# Patient Record
Sex: FEMALE | Race: WHITE | Employment: OTHER | URBAN - METROPOLITAN AREA
[De-identification: names, ages, dates, MRNs, and addresses within clinical notes are randomized per-mention and may not be internally consistent; named-entity substitution may affect disease eponyms.]

---

## 2017-04-24 ENCOUNTER — APPOINTMENT (OUTPATIENT)
Dept: CT IMAGING | Facility: CLINIC | Age: 82
End: 2017-04-24
Attending: EMERGENCY MEDICINE
Payer: COMMERCIAL

## 2017-04-24 ENCOUNTER — APPOINTMENT (OUTPATIENT)
Dept: GENERAL RADIOLOGY | Facility: CLINIC | Age: 82
End: 2017-04-24
Attending: EMERGENCY MEDICINE
Payer: COMMERCIAL

## 2017-04-24 ENCOUNTER — HOSPITAL ENCOUNTER (EMERGENCY)
Facility: CLINIC | Age: 82
Discharge: HOME | End: 2017-04-24
Attending: EMERGENCY MEDICINE | Admitting: EMERGENCY MEDICINE
Payer: COMMERCIAL

## 2017-04-24 VITALS
SYSTOLIC BLOOD PRESSURE: 171 MMHG | OXYGEN SATURATION: 95 % | HEART RATE: 95 BPM | TEMPERATURE: 97.4 F | RESPIRATION RATE: 16 BRPM | DIASTOLIC BLOOD PRESSURE: 80 MMHG

## 2017-04-24 DIAGNOSIS — S80.00XA CONTUSION OF KNEE, UNSPECIFIED LATERALITY, INITIAL ENCOUNTER: ICD-10-CM

## 2017-04-24 DIAGNOSIS — R10.32 ABDOMINAL PAIN, LEFT LOWER QUADRANT: ICD-10-CM
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
## 2017-04-24 DIAGNOSIS — M25.552 HIP PAIN, LEFT: ICD-10-CM

## 2017-04-24 LAB
ALBUMIN UR-MCNC: NEGATIVE MG/DL
ANION GAP SERPL CALCULATED.3IONS-SCNC: 7 MMOL/L (ref 3–14)
APPEARANCE UR: CLEAR
BACTERIA #/AREA URNS HPF: ABNORMAL /HPF
BASOPHILS # BLD AUTO: 0 10E9/L (ref 0–0.2)
BASOPHILS NFR BLD AUTO: 0.5 %
BILIRUB UR QL STRIP: NEGATIVE
BUN SERPL-MCNC: 6 MG/DL (ref 7–30)
CALCIUM SERPL-MCNC: 8.9 MG/DL (ref 8.5–10.1)
CHLORIDE SERPL-SCNC: 104 MMOL/L (ref 94–109)
CO2 SERPL-SCNC: 27 MMOL/L (ref 20–32)
COLOR UR AUTO: YELLOW
CREAT SERPL-MCNC: 0.5 MG/DL (ref 0.52–1.04)
DIFFERENTIAL METHOD BLD: ABNORMAL
EOSINOPHIL # BLD AUTO: 0.1 10E9/L (ref 0–0.7)
EOSINOPHIL NFR BLD AUTO: 1.6 %
ERYTHROCYTE [DISTWIDTH] IN BLOOD BY AUTOMATED COUNT: 12.7 % (ref 10–15)
GFR SERPL CREATININE-BSD FRML MDRD: ABNORMAL ML/MIN/1.7M2
GLUCOSE SERPL-MCNC: 277 MG/DL (ref 70–99)
GLUCOSE UR STRIP-MCNC: >499 MG/DL
HCT VFR BLD AUTO: 47.1 % (ref 35–47)
HGB BLD-MCNC: 15.4 G/DL (ref 11.7–15.7)
HGB UR QL STRIP: NEGATIVE
IMM GRANULOCYTES # BLD: 0 10E9/L (ref 0–0.4)
IMM GRANULOCYTES NFR BLD: 0.3 %
KETONES UR STRIP-MCNC: NEGATIVE MG/DL
LEUKOCYTE ESTERASE UR QL STRIP: ABNORMAL
LYMPHOCYTES # BLD AUTO: 2.1 10E9/L (ref 0.8–5.3)
LYMPHOCYTES NFR BLD AUTO: 28 %
MCH RBC QN AUTO: 29.6 PG (ref 26.5–33)
MCHC RBC AUTO-ENTMCNC: 32.7 G/DL (ref 31.5–36.5)
MCV RBC AUTO: 90 FL (ref 78–100)
MONOCYTES # BLD AUTO: 0.5 10E9/L (ref 0–1.3)
MONOCYTES NFR BLD AUTO: 7 %
MUCOUS THREADS #/AREA URNS LPF: PRESENT /LPF
NEUTROPHILS # BLD AUTO: 4.7 10E9/L (ref 1.6–8.3)
NEUTROPHILS NFR BLD AUTO: 62.6 %
NITRATE UR QL: NEGATIVE
NRBC # BLD AUTO: 0 10*3/UL
NRBC BLD AUTO-RTO: 0 /100
PH UR STRIP: 6 PH (ref 5–7)
PLATELET # BLD AUTO: 237 10E9/L (ref 150–450)
POTASSIUM SERPL-SCNC: 4 MMOL/L (ref 3.4–5.3)
RBC # BLD AUTO: 5.21 10E12/L (ref 3.8–5.2)
RBC #/AREA URNS AUTO: 2 /HPF (ref 0–2)
SODIUM SERPL-SCNC: 138 MMOL/L (ref 133–144)
SP GR UR STRIP: 1 (ref 1–1.03)
SQUAMOUS #/AREA URNS AUTO: 5 /HPF (ref 0–1)
URN SPEC COLLECT METH UR: ABNORMAL
UROBILINOGEN UR STRIP-MCNC: 0 MG/DL (ref 0–2)
WBC # BLD AUTO: 7.4 10E9/L (ref 4–11)
WBC #/AREA URNS AUTO: 8 /HPF (ref 0–2)

## 2017-04-24 PROCEDURE — 80048 BASIC METABOLIC PNL TOTAL CA: CPT | Performed by: EMERGENCY MEDICINE

## 2017-04-24 PROCEDURE — 25000128 H RX IP 250 OP 636: Performed by: EMERGENCY MEDICINE

## 2017-04-24 PROCEDURE — 25500064 ZZH RX 255 OP 636: Performed by: EMERGENCY MEDICINE

## 2017-04-24 PROCEDURE — 70450 CT HEAD/BRAIN W/O DYE: CPT

## 2017-04-24 PROCEDURE — 73502 X-RAY EXAM HIP UNI 2-3 VIEWS: CPT

## 2017-04-24 PROCEDURE — 73560 X-RAY EXAM OF KNEE 1 OR 2: CPT | Mod: LT

## 2017-04-24 PROCEDURE — 99285 EMERGENCY DEPT VISIT HI MDM: CPT | Mod: 25

## 2017-04-24 PROCEDURE — 81001 URINALYSIS AUTO W/SCOPE: CPT | Performed by: EMERGENCY MEDICINE

## 2017-04-24 PROCEDURE — 87086 URINE CULTURE/COLONY COUNT: CPT | Performed by: EMERGENCY MEDICINE

## 2017-04-24 PROCEDURE — 74177 CT ABD & PELVIS W/CONTRAST: CPT

## 2017-04-24 PROCEDURE — 85025 COMPLETE CBC W/AUTO DIFF WBC: CPT | Performed by: EMERGENCY MEDICINE

## 2017-04-24 RX ORDER — IBUPROFEN 200 MG
600 TABLET ORAL EVERY 6 HOURS PRN
Qty: 60 TABLET | Refills: 0 | Status: SHIPPED | OUTPATIENT
Start: 2017-04-24 | End: 2021-03-06

## 2017-04-24 RX ORDER — LIDOCAINE 40 MG/G
CREAM TOPICAL
Status: DISCONTINUED | OUTPATIENT
Start: 2017-04-24 | End: 2017-04-24 | Stop reason: HOSPADM

## 2017-04-24 RX ORDER — IOPAMIDOL 755 MG/ML
500 INJECTION, SOLUTION INTRAVASCULAR ONCE
Status: COMPLETED | OUTPATIENT
Start: 2017-04-24 | End: 2017-04-24

## 2017-04-24 RX ADMIN — IOPAMIDOL 74 ML: 755 INJECTION, SOLUTION INTRAVENOUS at 12:51

## 2017-04-24 RX ADMIN — SODIUM CHLORIDE 59 ML: 9 INJECTION, SOLUTION INTRAVENOUS at 12:51

## 2017-04-24 ASSESSMENT — ENCOUNTER SYMPTOMS
NAUSEA: 1
ARTHRALGIAS: 1
VOMITING: 0
ABDOMINAL PAIN: 1

## 2017-04-24 NOTE — ED PROVIDER NOTES
History     Chief Complaint:  Fall    The history is provided by the patient.      Zehra Salomon is a 85 year old female smoker with a history of diabetes who presents to the ED for evaluation after a fall. The patient reports she fell frontwards down some stairs 4/16, landing on her knees and hands. She states she hit her head on a wall when she fell, but she did not lose consciousness. She did not seek medical care after that fall and mentions she has been in persistent pain since. She complains of left hip pain, left knee pain and left lower quadrant abdominal pain and nausea. She denies any vomiting. She has been able to ambulate since the fall.    Allergies:  Metformin  Penicillins    Medications:    One touch ultra test  Amaryl  Aspirin    Past Medical History:    Abnormal stress test  BCC  GERD  Paget's disease  Chest pain  Menopausal syndrome  Hyperlipidemia  Chest Pain    Past Surgical History:    Angiogram  Drain abscess left hip  Cholecystectomy  Hernia repair    Family History:    Diabetes- mother, brother  Throat cancer- brother  Cardiovascular- father    Social History:  The patient was accompanied to the ED by her grandson.  Smoking Status: Current every day smoker 1.00 packs daily for 30 yrs  Alcohol Use: No     Review of Systems   Gastrointestinal: Positive for abdominal pain (LLQ) and nausea. Negative for vomiting.   Musculoskeletal: Positive for arthralgias.        Left knee and left hip pain.   All other systems reviewed and are negative.    Physical Exam   First Vitals:  BP: (!) 178/102  Pulse: 95  Temp: 97.4  F (36.3  C)  Resp: 16  SpO2: 95 %      Physical Exam  Constitutional: Patient appears well-developed and well-nourished. Patient is not in distress  HENT:   Head: No external signs of trauma noted.  Eyes: Conjunctivae are normal. Pupils are equal, round, and reactive to light.   Cardiovascular:   Normal rate, regular rhythm and normal heart sounds.   Exam reveals no friction rub.   No  murmur heard.  Pulmonary/Chest:   Effort normal and breath sounds normal.   No respiratory distress.   There are no wheezes.   There are no rales.   Abdominal:   Soft.   Bowel sounds normal.   There is mild distension.   There is LLQ tenderness.   There is no rebound or guarding.   Musculoskeletal:   Normal range of motion.   Normal Tone  No CVA tenderness  No midline C, T, or L-spine tenderness.  There is left lumbar paraspinal tenderness  Neurological: Patient is alert and oriented to person, place, and time. Normal strength and sensation.  Skin: Skin is warm and dry. Patient is not diaphoretic.    Emergency Department Course     Imaging:  Radiographic findings were communicated with the patient who voiced understanding of the findings.    Knee XR, 1/2 views, left:  IMPRESSION: Degenerative change involving the medial compartment. No  fracture or other evidence of injury is seen.   Per Radiology.    Head CT w/o Contrast:   IMPRESSION: Diffuse cerebral volume loss and cerebral white matter  changes consistent with chronic small vessel ischemic disease. No  evidence for acute intracranial pathology.   Preliminary result, per Radiology.    CT Abdomen Pelvis w IV Contrast:  IMPRESSION:   1. No evidence of traumatic organ injury in the abdomen or pelvis.  2. Fatty infiltration of the liver.  3. Sigmoid diverticulosis. No evidence of acute diverticulitis.  Preliminary result, per Radiology.    XR Pelvis w Hip Left:   IMPRESSION: No fracture or osseous lesion is seen. There are mild  degenerative changes in both hip joints including marginal osteophyte  formation. No other abnormality is seen.   Per Radiology.    Laboratory:  CBC: WBC 7.4, HGB 15.4,   BMP: Glucose 277(H), BUN 6(L), Creat 0.50(L), o/w WNL    UA:Glucose >499(A), Leukocyte Esterase Trace, Bacteria Moderate, Squamous Epithelial 5(H), Mucous Present, WBC/HPF 8(H) o/w negative  Urine Culture Pending    Interventions:  1251 NS Bolus IV    Emergency  Department Course:  Nursing notes and vitals reviewed.  I performed an exam of the patient as documented above.     Findings and plan explained to the patient. Patient discharged home with instructions regarding supportive care, medications, and reasons to return. The importance of close follow-up was reviewed.     Impression & Plan    Medical Decision Making:  Zehra Salomon is an 85 year old female who presents to the ER for evaluation of abdominal pain, as well as left hip and knee pain after a fall. The patient just simply has not been feeling better after the fall. She states she also did strike her head. All of her radiological imaging is normal. CT did find evidence of diverticulosis, but not diverticulitis. The patient's blood work looks normal. At this time, I believe she can be discharged, but she should follow up in the outpatient setting with her primary care physician.  Anticipatory guidance given prior to discharge.    Diagnosis:  1. (W19.XXXA) Fall, initial encounter    2. (S80.00XA) Contusion of knee, unspecified laterality, initial encounter    Disposition:  The patient was discharged home.    4/24/2017   [facility] EMERGENCY DEPARTMENT    ELLEN, Sejal Hathaway, am serving as a scribe at 11:10 on April 24, 2017 to document services personally performed by  Dr. Smith, based on my observations and the provider's statements to me.       Jasen Smith,   04/24/17 [id number]

## 2017-04-24 NOTE — ED NOTES
Patient presents to the ED with left hip and LLQ abdominal pain. Reports has had pain since fell on the 16th. States slipped while going down the steps and landed on knees and hands. Not seen after fall. Has had persistent pain since. Is ambulatory.

## 2017-04-24 NOTE — ED AVS SNAPSHOT
[facility] Emergency Department    [address]    [facility] [address]    Phone:  [phone]    Fax:  [phone]                                       Zehra Salomon   MRN: [id number]    Department:  [facility] Emergency Department   Date of Visit:  4/24/2017           After Visit Summary Signature Page     I have received my discharge instructions, and my questions have been answered. I have discussed any challenges I see with this plan with the nurse or doctor.    ..........................................................................................................................................  Patient/Patient Representative Signature      ..........................................................................................................................................  Patient Representative Print Name and Relationship to Patient    ..................................................               ................................................  Date                                            Time    ..........................................................................................................................................  Reviewed by Signature/Title    ...................................................              ..............................................  Date                                                            Time

## 2017-04-24 NOTE — ED AVS SNAPSHOT
[facility] Emergency Department    [address]    [facility] [address]    Phone:  [phone]    Fax:  [phone]                                       Zehra Salomon   MRN: [id number]    Department:  [facility] Emergency Department   Date of Visit:  4/24/2017           Patient Information     Date Of Birth          3/27/1932        Your diagnoses for this visit were:     Fall, initial encounter     Contusion of knee, unspecified laterality, initial encounter     Abdominal pain, left lower quadrant     Hip pain, left        You were seen by Jasen Smith DO.      Follow-up Information     Follow up with Mary Solis MD. Call in 2 days.    Specialty:  Internal Medicine    Why:  As needed    Contact information:    [facility]  [address]  [facility] [address]  [phone]          Follow up with [facility] Emergency Department.    Specialty:  EMERGENCY MEDICINE    Why:  If symptoms worsen    Contact information:    [address] [facility] [address] [phone]        Discharge Instructions         *Abdominal Pain, Unknown Cause (Female)    The exact cause of your abdominal (stomach) pain is not certain. This does not mean that this is something to worry about, or the right tests were not done. Everyone likes to know the exact cause of the problem, but sometimes with abdominal pain, there is no clear-cut cause, and this could be a good thing. The good news is that your symptoms can be treated, and you will feel better.   Your condition does not seem serious now; however, sometimes the signs of a serious problem may take more time to appear. For this reason, it is important for you to watch for any new symptoms, problems, or worsening of your condition.  Over the next few days, the abdominal pain may come and go, or be continuous. Other common symptoms can include nausea and vomiting. Sometimes it can be  difficult to tell if you feel nauseous, you may just feel bad and not associate that feeling with nausea. Constipation, diarrhea, and a fever may go along with the pain.  The pain may continue even if treated correctly over the following days. Depending on how things go, sometimes the cause can become clear and may require further or different treatment. Additional evaluations, medications, or tests may be needed.  Home care  Your health care provider may prescribe medications for pain, symptoms, or an infection.  Follow the health care provider's instructions for taking these medications.  General care    Rest until your next exam. No strenuous activities.    Try to find positions that ease discomfort. A small pillow placed on the abdomen may help relieve pain.    Something warm on your abdomen (such as a heating pad) may help, but be careful not to burn yourself.  Diet    Do not force yourself to eat, especially if having cramps, vomiting, or diarrhea.    Water is important so you do not get dehydrated. Soup may also be good. Sports drinks may also help, especially if they are not too acidic. Make sure you don't drink sugary drinks as this can make things worse. Take liquids in small amounts. Do not guzzle them.    Caffeine sometimes makes the pain and cramping worse.    Avoid dairy products if you have vomiting or diarrhea.    Don't eat large amounts at a time. Wait a few minutes between bites.    Eat a diet low in fiber (called a low-residue diet). Foods allowed include refined breads, white rice, fruit and vegetable juices without pulp, tender meats. These foods will pass more easily through the intestine.    Avoid fried or fatty foods, dairy, alcohol and spicy foods until your symptoms go away.  Follow-up care  Follow up with your health care provider as instructed, or if your pain does not begin to improve in the next 24 hours.  When to seek medical care  Seek prompt medical care if any of the following  occur:    Pain gets worse or moves to the right lower abdomen    New or worsening vomiting or diarrhea    Swelling of the abdomen    Unable to pass stool for more than three days    New fever over 101  F (38.3 C), or rising fever    Blood in vomit or bowel movements (dark red or black color)    Jaundice (yellow color of eyes and skin)    Weakness, dizziness    Chest, arm, back, neck or jaw pain    Unexpected vaginal bleeding or missed period  Call 911  Call emergency services if any of the following occur:    Trouble breathing    Confusion    Fainting or loss of consciousness    Rapid heart rate    Seizure    7638-2654 [facility], [address] [place] [address]. All rights reserved. This information is not intended as a substitute for professional medical care. Always follow your healthcare professional's instructions.          Bruises (Contusions)    A contusion is a bruise. A bruise happens when a blow to your body doesn't break the skin but does break blood vessels beneath the skin. Blood leaking from the broken vessels causes redness and swelling. As it heals, your bruise is likely to turn colors like purple, green, and yellow. This is normal. The bruise should fade in 2 or 3 weeks.  Factors that make you more likely to bruise  Almost everyone bruises now and then. Certain people do bruise more easily than others. You're more prone to bruising as you get older. That's because blood vessels become more fragile with age. You're also more likely to bruise if you have a clotting disorder such as hemophilia or take medications that reduce clotting, including aspirin.  When to go to the emergency room (ER)  Bruises almost always heal on their own without special treatment. But for some people, a bad bruise can be serious. Seek medical care if you:    Have a clotting disorder such as hemophilia.    Have cirrhosis or other serious liver disease.    Take blood-thinning medications such as warfarin  (Coumadin).  What to expect in the ER  A doctor will examine your bruise and ask about any health conditions you have. In some cases, you may have a test to check how well your blood clots. Other treatment will depend on your needs.  Follow-up care  Sometimes a bruise gets worse instead of better. It may become larger and more swollen. This can occur when your body walls off a small pool of blood under the skin (hematoma). In very rare cases, your doctor may need to drain excess blood from the area.  Tip:  Apply an ice pack or bag of frozen peas to a bruise (keep a thin cloth between the cold source and your skin). This can help reduce redness and swelling.     7469-0603 [employer]. [address] [place] [address], [place] [address]. All rights reserved. This information is not intended as a substitute for professional medical care. Always follow your healthcare professional's instructions.        Fall Prevention  Falls often occur due to slipping, tripping or losing your balance. Millions of people fall every year and injure themselves. Here are ways to reduce your risk of falling again.    Think about your fall, was there anything that caused your fall that can be fixed, removed, or replaced?    Make your home safe by keeping walkways clear of objects you may trip over.    Use non-slip pads under rugs. Do not use area rugs or small throw rugs.    Use non-slip mats in bathtubs and showers.    Install handrails and lights on staircases.    Do not walk in poorly lit areas.    Do not stand on chairs or wobbly ladders.    Use caution when reaching overhead or looking upward. This position can cause a loss of balance.    Be sure your shoes fit properly, have non-slip bottoms and are in good condition.     Wear shoes both inside and out. Avoid going barefoot or wearing slippers.    Be cautious when going up and down stairs, curbs, and when walking on uneven sidewalks.    If your balance is poor, consider using a  cane or walker.    If your fall was related to alcohol use, stop or limit alcohol intake.     If your fall was related to use of sleeping medicines, talk to your doctor about this. You may need to reduce your dosage at bedtime if you awaken during the night to go to the bathroom.      To reduce the need for nighttime bathroom trips:    Avoid drinking fluids for several hours before going to bed    Empty your bladder before going to bed    Men can keep a urinal at the bedside    Stay as active as you can. Balance, flexibility, strength, and endurance all come from exercise. They all play a role in preventing falls. Ask your healthcare provider which types of activity are right for you.    Get your vision checked on a regular basis.    If you have pets, know where they are before you stand up or walk so you don't trip over them.    Use night lights.    5050-1220 [employer]. [address] [place] [address]. All rights reserved. This information is not intended as a substitute for professional medical care. Always follow your healthcare professional's instructions.          24 Hour Appointment Hotline       To make an appointment at any [facility], call [phone] ([phone]). If you don't have a family doctor or clinic, we will help you find one. [facility] clinics are conveniently located to serve the needs of you and your family.             Review of your medicines      START taking        Dose / Directions Last dose taken    ibuprofen 200 MG tablet   Commonly known as:  ADVIL/MOTRIN   Dose:  600 mg   Quantity:  60 tablet        Take 3 tablets (600 mg) by mouth every 6 hours as needed for mild pain or fever   Refills:  0          Our records show that you are taking the medicines listed below. If these are incorrect, please call your family doctor or clinic.        Dose / Directions Last dose taken    aspirin 81 MG tablet   Quantity:  100        ONE DAILY   Refills:  3         blood glucose monitoring meter device kit   Dose:  1 Device   Quantity:  1 kit        1 Device by Device route daily. Use to test blood sugars 2x daily as directed.   Refills:  0        blood glucose monitoring test strip   Commonly known as:  ONE TOUCH ULTRA   Dose:  1 Box   Quantity:  100 strip        1 Box by Strip route 2 times daily.   Refills:  prn        glimepiride 2 MG tablet   Commonly known as:  AMARYL   Dose:  2 mg   Quantity:  90 tablet        Take 1 tablet by mouth every morning (before breakfast).   Refills:  3        ONE TOUCH DELICA LANCETS Misc   Quantity:  100 each        Use to test blood sugars 2 daily or as directed.   Refills:  5                Prescriptions were sent or printed at these locations (1 Prescription)                   Other Prescriptions                Printed at Department/Unit printer (1 of 1)         ibuprofen (ADVIL/MOTRIN) 200 MG tablet                Procedures and tests performed during your visit     Basic metabolic panel    CBC with platelets differential    CT Abdomen Pelvis w IV contrast only (Trauma/AAA)    Head CT w/o contrast    Peripheral IV catheter    UA with Microscopic    Urine Culture    XR Knee Left 1/2 Views    XR Pelvis w Hip Left 1 View      Orders Needing Specimen Collection     None      Pending Results     Date and Time Order Name Status Description    4/24/2017 1121 Urine Culture In process     4/24/2017 1120 CT Abdomen Pelvis w IV contrast only (Trauma/AAA) Preliminary             Pending Culture Results     Date and Time Order Name Status Description    4/24/2017 1121 Urine Culture In process             Test Results From Your Hospital Stay        4/24/2017 12:02 PM      Component Results     Component Value Ref Range & Units Status    WBC 7.4 4.0 - 11.0 10e9/L Final    RBC Count 5.21 (H) 3.8 - 5.2 10e12/L Final    Hemoglobin 15.4 11.7 - 15.7 g/dL Final    Hematocrit 47.1 (H) 35.0 - 47.0 % Final    MCV 90 78 - 100 fl Final    MCH 29.6 26.5 - 33.0 pg  Final    MCHC 32.7 31.5 - 36.5 g/dL Final    RDW 12.7 10.0 - 15.0 % Final    Platelet Count 237 150 - 450 10e9/L Final    Diff Method Automated Method  Final    % Neutrophils 62.6 % Final    % Lymphocytes 28.0 % Final    % Monocytes 7.0 % Final    % Eosinophils 1.6 % Final    % Basophils 0.5 % Final    % Immature Granulocytes 0.3 % Final    Nucleated RBCs 0 0 /100 Final    Absolute Neutrophil 4.7 1.6 - 8.3 10e9/L Final    Absolute Lymphocytes 2.1 0.8 - 5.3 10e9/L Final    Absolute Monocytes 0.5 0.0 - 1.3 10e9/L Final    Absolute Eosinophils 0.1 0.0 - 0.7 10e9/L Final    Absolute Basophils 0.0 0.0 - 0.2 10e9/L Final    Abs Immature Granulocytes 0.0 0 - 0.4 10e9/L Final    Absolute Nucleated RBC 0.0  Final         4/24/2017 12:24 PM      Component Results     Component Value Ref Range & Units Status    Sodium 138 133 - 144 mmol/L Final    Potassium 4.0 3.4 - 5.3 mmol/L Final    Chloride 104 94 - 109 mmol/L Final    Carbon Dioxide 27 20 - 32 mmol/L Final    Anion Gap 7 3 - 14 mmol/L Final    Glucose 277 (H) 70 - 99 mg/dL Final    Urea Nitrogen 6 (L) 7 - 30 mg/dL Final    Creatinine 0.50 (L) 0.52 - 1.04 mg/dL Final    GFR Estimate >90  Non  GFR Calc   >60 mL/min/1.7m2 Final    GFR Estimate If Black >90   GFR Calc   >60 mL/min/1.7m2 Final    Calcium 8.9 8.5 - 10.1 mg/dL Final         4/24/2017  1:29 PM      Narrative     CT ABDOMEN AND PELVIS WITH CONTRAST April 24, 2017 12:58 PM     HISTORY: Fall on April 16th, complains of left lower quadrant pain.    TECHNIQUE: Volumetric helical acquisition of CT images from the lung  bases through the symphysis pubis after the administration of 74 mL of  Isovue 370 intravenous  contrast. Radiation dose for this scan was  reduced using automated exposure control, adjustment of the mA and/or  kV according to patient size, or iterative reconstruction technique.    COMPARISON: 10/31/2009.    FINDINGS: The lung bases are clear. There is diffuse  fatty  infiltration of the liver. No evidence of hepatic laceration.  Postoperative changes of cholecystectomy. The spleen, pancreas,  adrenal glands, and kidneys show no acute abnormality. There are  bilateral renal cysts. Large and small bowel are of normal caliber.  There is sigmoid diverticulosis. No evidence of acute diverticulitis.  Moderate amount of stool throughout the colon. No ascites or fluid  collections. No evidence of free intraperitoneal air. Moderate  atherosclerotic changes in the aorta and iliac arteries. The distal  thoracic and upper abdominal aorta are ectatic. No acute fractures are  identified. There are degenerative changes in the lumbar spine.         Impression     IMPRESSION:   1. No evidence of traumatic organ injury in the abdomen or pelvis.  2. Fatty infiltration of the liver.  3. Sigmoid diverticulosis. No evidence of acute diverticulitis.               4/24/2017  1:52 PM      Narrative     CT OF THE HEAD WITHOUT CONTRAST April 24, 2017 12:58 PM     HISTORY: Fall, head injury.    TECHNIQUE: 5 mm thick axial CT images of the head were acquired  without IV contrast material. Radiation dose for this scan was reduced  using automated exposure control, adjustment of the mA and/or kV  according to patient size, or iterative reconstruction technique.    COMPARISON: Head CT 10/31/2009.    FINDINGS: There is mild diffuse cerebral volume loss. There are subtle  patchy areas of decreased density in the cerebral white matter  bilaterally that are consistent with sequela of chronic small vessel  ischemic disease.    The ventricles and basal cisterns are within normal limits in  configuration given the degree of cerebral volume loss. There is no  midline shift. There are no extra-axial fluid collections.     No intracranial hemorrhage, mass or recent infarct.    The visualized paranasal sinuses are well-aerated. There is no  mastoiditis. There are no fractures of the visualized bones.          Impression     IMPRESSION: Diffuse cerebral volume loss and cerebral white matter  changes consistent with chronic small vessel ischemic disease. No  evidence for acute intracranial pathology.             MICHAEL SRIVASTAVA MD         4/24/2017  1:11 PM      Component Results     Component Value Ref Range & Units Status    Color Urine Yellow  Final    Appearance Urine Clear  Final    Glucose Urine >499 (A) NEG mg/dL Final    Bilirubin Urine Negative NEG Final    Ketones Urine Negative NEG mg/dL Final    Specific Gravity Urine 1.005 1.003 - 1.035 Final    Blood Urine Negative NEG Final    pH Urine 6.0 5.0 - 7.0 pH Final    Protein Albumin Urine Negative NEG mg/dL Final    Urobilinogen mg/dL 0.0 0.0 - 2.0 mg/dL Final    Nitrite Urine Negative NEG Final    Leukocyte Esterase Urine Trace (A) NEG Final    Source Midstream Urine  Final    WBC Urine 8 (H) 0 - 2 /HPF Final    RBC Urine 2 0 - 2 /HPF Final    Bacteria Urine Moderate (A) NEG /HPF Final    Squamous Epithelial /HPF Urine 5 (H) 0 - 1 /HPF Final    Mucous Urine Present (A) NEG /LPF Final         4/24/2017 12:53 PM         4/24/2017  1:32 PM      Narrative     PELVIS AND LEFT HIP ONE VIEW April 24, 2017 1:23 PM    HISTORY: Pain after falling.    COMPARISON: None.        Impression     IMPRESSION: No fracture or osseous lesion is seen. There are mild  degenerative changes in both hip joints including marginal osteophyte  formation. No other abnormality is seen.     RITA KELLY MD         4/24/2017  1:32 PM      Narrative     LEFT KNEE TWO VIEWS April 24, 2017 1:22 PM    HISTORY: Pain after falling.    COMPARISON: None.    FINDINGS: No fracture or osseous lesion is seen. There is moderate  joint space loss in the medial compartment. The remaining joint spaces  are well preserved. No soft tissue pathology is seen.        Impression     IMPRESSION: Degenerative change involving the medial compartment. No  fracture or other evidence of injury is seen.     RITA  MD ROBIN                Clinical Quality Measure: Blood Pressure Screening     Your blood pressure was checked while you were in the emergency department today. The last reading we obtained was  BP: 171/80 . Please read the guidelines below about what these numbers mean and what you should do about them.  If your systolic blood pressure (the top number) is less than 120 and your diastolic blood pressure (the bottom number) is less than 80, then your blood pressure is normal. There is nothing more that you need to do about it.  If your systolic blood pressure (the top number) is 120-139 or your diastolic blood pressure (the bottom number) is 80-89, your blood pressure may be higher than it should be. You should have your blood pressure rechecked within a year by a primary care provider.  If your systolic blood pressure (the top number) is 140 or greater or your diastolic blood pressure (the bottom number) is 90 or greater, you may have high blood pressure. High blood pressure is treatable, but if left untreated over time it can put you at risk for heart attack, stroke, or kidney failure. You should have your blood pressure rechecked by a primary care provider within the next 4 weeks.  If your provider in the emergency department today gave you specific instructions to follow-up with your doctor or provider even sooner than that, you should follow that instruction and not wait for up to 4 weeks for your follow-up visit.        Thank you for choosing [facility]       Thank you for choosing [facility] for your care. Our goal is always to provide you with excellent care. Hearing back from our patients is one way we can continue to improve our services. Please take a few minutes to complete the written survey that you may receive in the mail after you visit with us. Thank you!        [employer] Information     [employer] lets you send messages to your doctor, view your test results, renew your prescriptions, schedule  "appointments and more. To sign up, go to [email or web address] . Click on \"Log in\" on the left side of the screen, which will take you to the Welcome page. Then click on \"Sign up Now\" on the right side of the page.     You will be asked to enter the access code listed below, as well as some personal information. Please follow the directions to create your username and password.     Your access code is: [email or web address]  Expires: 2017  2:25 PM     Your access code will  in 90 days. If you need help or a new code, please call your [facility] clinic or [phone].        Care EveryWhere ID     This is your Care EveryWhere ID. This could be used by other organizations to access your [facility] medical records  [id number]        After Visit Summary       This is your record. Keep this with you and show to your community pharmacist(s) and doctor(s) at your next visit.                  "

## 2017-04-24 NOTE — DISCHARGE INSTRUCTIONS
*Abdominal Pain, Unknown Cause (Female)    The exact cause of your abdominal (stomach) pain is not certain. This does not mean that this is something to worry about, or the right tests were not done. Everyone likes to know the exact cause of the problem, but sometimes with abdominal pain, there is no clear-cut cause, and this could be a good thing. The good news is that your symptoms can be treated, and you will feel better.   Your condition does not seem serious now; however, sometimes the signs of a serious problem may take more time to appear. For this reason, it is important for you to watch for any new symptoms, problems, or worsening of your condition.  Over the next few days, the abdominal pain may come and go, or be continuous. Other common symptoms can include nausea and vomiting. Sometimes it can be difficult to tell if you feel nauseous, you may just feel bad and not associate that feeling with nausea. Constipation, diarrhea, and a fever may go along with the pain.  The pain may continue even if treated correctly over the following days. Depending on how things go, sometimes the cause can become clear and may require further or different treatment. Additional evaluations, medications, or tests may be needed.  Home care  Your health care provider may prescribe medications for pain, symptoms, or an infection.  Follow the health care provider's instructions for taking these medications.  General care    Rest until your next exam. No strenuous activities.    Try to find positions that ease discomfort. A small pillow placed on the abdomen may help relieve pain.    Something warm on your abdomen (such as a heating pad) may help, but be careful not to burn yourself.  Diet    Do not force yourself to eat, especially if having cramps, vomiting, or diarrhea.    Water is important so you do not get dehydrated. Soup may also be good. Sports drinks may also help, especially if they are not too acidic. Make sure you  don't drink sugary drinks as this can make things worse. Take liquids in small amounts. Do not guzzle them.    Caffeine sometimes makes the pain and cramping worse.    Avoid dairy products if you have vomiting or diarrhea.    Don't eat large amounts at a time. Wait a few minutes between bites.    Eat a diet low in fiber (called a low-residue diet). Foods allowed include refined breads, white rice, fruit and vegetable juices without pulp, tender meats. These foods will pass more easily through the intestine.    Avoid fried or fatty foods, dairy, alcohol and spicy foods until your symptoms go away.  Follow-up care  Follow up with your health care provider as instructed, or if your pain does not begin to improve in the next 24 hours.  When to seek medical care  Seek prompt medical care if any of the following occur:    Pain gets worse or moves to the right lower abdomen    New or worsening vomiting or diarrhea    Swelling of the abdomen    Unable to pass stool for more than three days    New fever over 101  F (38.3 C), or rising fever    Blood in vomit or bowel movements (dark red or black color)    Jaundice (yellow color of eyes and skin)    Weakness, dizziness    Chest, arm, back, neck or jaw pain    Unexpected vaginal bleeding or missed period  Call 911  Call emergency services if any of the following occur:    Trouble breathing    Confusion    Fainting or loss of consciousness    Rapid heart rate    Seizure    4385-1833 [facility], [address] [place] [address], [place] [address]. All rights reserved. This information is not intended as a substitute for professional medical care. Always follow your healthcare professional's instructions.          Bruises (Contusions)    A contusion is a bruise. A bruise happens when a blow to your body doesn't break the skin but does break blood vessels beneath the skin. Blood leaking from the broken vessels causes redness and swelling. As it heals, your bruise is likely to turn  colors like purple, green, and yellow. This is normal. The bruise should fade in 2 or 3 weeks.  Factors that make you more likely to bruise  Almost everyone bruises now and then. Certain people do bruise more easily than others. You're more prone to bruising as you get older. That's because blood vessels become more fragile with age. You're also more likely to bruise if you have a clotting disorder such as hemophilia or take medications that reduce clotting, including aspirin.  When to go to the emergency room (ER)  Bruises almost always heal on their own without special treatment. But for some people, a bad bruise can be serious. Seek medical care if you:    Have a clotting disorder such as hemophilia.    Have cirrhosis or other serious liver disease.    Take blood-thinning medications such as warfarin (Coumadin).  What to expect in the ER  A doctor will examine your bruise and ask about any health conditions you have. In some cases, you may have a test to check how well your blood clots. Other treatment will depend on your needs.  Follow-up care  Sometimes a bruise gets worse instead of better. It may become larger and more swollen. This can occur when your body walls off a small pool of blood under the skin (hematoma). In very rare cases, your doctor may need to drain excess blood from the area.  Tip:  Apply an ice pack or bag of frozen peas to a bruise (keep a thin cloth between the cold source and your skin). This can help reduce redness and swelling.     7190-4206 [employer]. [address] [place] [address], [place] [address]. All rights reserved. This information is not intended as a substitute for professional medical care. Always follow your healthcare professional's instructions.        Fall Prevention  Falls often occur due to slipping, tripping or losing your balance. Millions of people fall every year and injure themselves. Here are ways to reduce your risk of falling again.    Think about your  fall, was there anything that caused your fall that can be fixed, removed, or replaced?    Make your home safe by keeping walkways clear of objects you may trip over.    Use non-slip pads under rugs. Do not use area rugs or small throw rugs.    Use non-slip mats in bathtubs and showers.    Install handrails and lights on staircases.    Do not walk in poorly lit areas.    Do not stand on chairs or wobbly ladders.    Use caution when reaching overhead or looking upward. This position can cause a loss of balance.    Be sure your shoes fit properly, have non-slip bottoms and are in good condition.     Wear shoes both inside and out. Avoid going barefoot or wearing slippers.    Be cautious when going up and down stairs, curbs, and when walking on uneven sidewalks.    If your balance is poor, consider using a cane or walker.    If your fall was related to alcohol use, stop or limit alcohol intake.     If your fall was related to use of sleeping medicines, talk to your doctor about this. You may need to reduce your dosage at bedtime if you awaken during the night to go to the bathroom.      To reduce the need for nighttime bathroom trips:    Avoid drinking fluids for several hours before going to bed    Empty your bladder before going to bed    Men can keep a urinal at the bedside    Stay as active as you can. Balance, flexibility, strength, and endurance all come from exercise. They all play a role in preventing falls. Ask your healthcare provider which types of activity are right for you.    Get your vision checked on a regular basis.    If you have pets, know where they are before you stand up or walk so you don't trip over them.    Use night lights.    5884-4374 [employer]. [address] [place] [address], [place] [address]. All rights reserved. This information is not intended as a substitute for professional medical care. Always follow your healthcare professional's instructions.

## 2017-04-25 LAB
BACTERIA SPEC CULT: NORMAL
Lab: NORMAL
MICRO REPORT STATUS: NORMAL
SPECIMEN SOURCE: NORMAL

## 2018-05-28 ENCOUNTER — HOSPITAL ENCOUNTER (EMERGENCY)
Facility: CLINIC | Age: 83
Discharge: HOME | End: 2018-05-28
Attending: EMERGENCY MEDICINE
Payer: COMMERCIAL

## 2018-05-28 VITALS
SYSTOLIC BLOOD PRESSURE: 174 MMHG | WEIGHT: 135 LBS | BODY MASS INDEX: 23.05 KG/M2 | RESPIRATION RATE: 20 BRPM | DIASTOLIC BLOOD PRESSURE: 93 MMHG | HEIGHT: 64 IN | OXYGEN SATURATION: 94 % | HEART RATE: 93 BPM | TEMPERATURE: 97.9 F

## 2018-05-28 DIAGNOSIS — R06.02 SOB (SHORTNESS OF BREATH): ICD-10-CM

## 2018-05-28 NOTE — ED PROVIDER NOTES
Before I could evaluate the patient, nurse reports that patient left ED.     Yazmin Oleary MD  05/28/18 8524

## 2018-12-03 DIAGNOSIS — R33.9 URINARY RETENTION: Primary | ICD-10-CM

## 2020-08-19 ENCOUNTER — APPOINTMENT (OUTPATIENT)
Dept: ULTRASOUND IMAGING | Facility: CLINIC | Age: 85
End: 2020-08-19
Attending: EMERGENCY MEDICINE
Payer: COMMERCIAL

## 2020-08-19 ENCOUNTER — HOSPITAL ENCOUNTER (EMERGENCY)
Facility: CLINIC | Age: 85
Discharge: HOME | End: 2020-08-19
Attending: EMERGENCY MEDICINE | Admitting: EMERGENCY MEDICINE
Payer: COMMERCIAL

## 2020-08-19 VITALS
OXYGEN SATURATION: 95 % | SYSTOLIC BLOOD PRESSURE: 176 MMHG | HEART RATE: 80 BPM | TEMPERATURE: 97.4 F | HEIGHT: 63 IN | RESPIRATION RATE: 16 BRPM | BODY MASS INDEX: 23.04 KG/M2 | DIASTOLIC BLOOD PRESSURE: 66 MMHG | WEIGHT: 130 LBS

## 2020-08-19 DIAGNOSIS — L03.116 CELLULITIS OF LEFT LOWER EXTREMITY: ICD-10-CM

## 2020-08-19 PROCEDURE — 99284 EMERGENCY DEPT VISIT MOD MDM: CPT | Mod: 25

## 2020-08-19 PROCEDURE — 93971 EXTREMITY STUDY: CPT | Mod: LT

## 2020-08-19 PROCEDURE — 25000132 ZZH RX MED GY IP 250 OP 250 PS 637: Performed by: EMERGENCY MEDICINE

## 2020-08-19 RX ORDER — ATORVASTATIN CALCIUM 20 MG/1
20 TABLET, FILM COATED ORAL DAILY
COMMUNITY
Start: 2020-05-21

## 2020-08-19 RX ORDER — CLOPIDOGREL BISULFATE 75 MG/1
75 TABLET ORAL DAILY
COMMUNITY
Start: 2020-06-10

## 2020-08-19 RX ORDER — AMLODIPINE BESYLATE 5 MG/1
5 TABLET ORAL DAILY
COMMUNITY
Start: 2020-05-26

## 2020-08-19 RX ORDER — CEPHALEXIN 500 MG/1
500 CAPSULE ORAL ONCE
Status: COMPLETED | OUTPATIENT
Start: 2020-08-19 | End: 2020-08-19

## 2020-08-19 RX ORDER — CEPHALEXIN 500 MG/1
500 CAPSULE ORAL 4 TIMES DAILY
Qty: 27 CAPSULE | Refills: 0 | Status: SHIPPED | OUTPATIENT
Start: 2020-08-20 | End: 2020-08-27

## 2020-08-19 RX ADMIN — CEPHALEXIN 500 MG: 500 CAPSULE ORAL at 21:32

## 2020-08-19 ASSESSMENT — ENCOUNTER SYMPTOMS
ARTHRALGIAS: 1
COLOR CHANGE: 1

## 2020-08-19 ASSESSMENT — MIFFLIN-ST. JEOR: SCORE: 988.81

## 2020-08-19 NOTE — ED AVS SNAPSHOT
Emergency Department  [address] [place] [id number]  Phone:  [phone]  Fax:  [phone]                                    Zehra Salomon   MRN: [id number]    Department:   Emergency Department   Date of Visit:  8/19/2020           After Visit Summary Signature Page    I have received my discharge instructions, and my questions have been answered. I have discussed any challenges I see with this plan with the nurse or doctor.    ..........................................................................................................................................  Patient/Patient Representative Signature      ..........................................................................................................................................  Patient Representative Print Name and Relationship to Patient    ..................................................               ................................................  Date                                   Time    ..........................................................................................................................................  Reviewed by Signature/Title    ...................................................              ..............................................  Date                                               Time          22EPIC Rev 08/18

## 2020-08-20 NOTE — DISCHARGE INSTRUCTIONS
You should  the rest of your antibiotic course tomorrow morning and start taking them as prescribed.  You should return the emergency room if you get increasing pain, redness, swelling or develop fever in that leg.

## 2020-08-20 NOTE — ED PROVIDER NOTES
"History     Chief Complaint:  Ankle Pain     HPI  Zehra Salomon is a 88 year old female with a history of TIA, GERD, Hyperlipidemia who presents for evaluation of ankle pain.The patient states th she fell at home ten days ago and she did not present to a clinic as she has an at home nurse that came in between this time. She states that she has been ambulatory. Her nurse does add concern for cellulitis as there is erythema and warmth to the left calf and ankle. She denies any chest pain or chest pressure.    Allergies:  Metformin   Penicillin     Medications:   Norvasc   Plavix   Lipitor  Asprin 81 mg   Amaryl     Medical History:   basal cell carcinoma of skin   Macular degeneration   Hyperlipidemia    Paget's disease  diabetes mellitus type 2   GERD     Surgical History   Angiogram   Drain mouth abscess   Herniorrhaphy  Cholecystectomy     Family History:   Mother: diabetes  Father:  Cardiovascular    Social History:  Patient was not accompanied to the ED.   Smoking Status:  Smoker    Type: Cigarettes   Packs/Day: 0.25  Smokeless Tobacco: Never Used   Alcohol Use: Negative   Drug Use: Negative    Mary Solis       Review of Systems   Cardiovascular: Negative for chest pain (neg pressure).   Musculoskeletal: Positive for arthralgias (ankle).   Skin: Positive for color change.   All other systems reviewed and negative.       Physical Exam     Patient Vitals for the past 24 hrs:   BP Temp Temp src Pulse Resp SpO2 Height Weight   08/19/20 1902 (!) 196/62 97.4  F (36.3  C) Temporal 83 16 95 % 1.6 m (5' 3\") 59 kg (130 lb)          Physical Exam  Constitutional: Alert, attentive, GCS 15  HENT:    Nose: Nose normal.    Mouth/Throat: Oropharynx is clear, mucous membranes are moist  Eyes: EOM are normal, anicteric, conjugate gaze  CV: regular rate and rhythm; no murmurs  Chest: Effort normal and breath sounds clear without wheezing or rales, symmetric bilaterally   GI:  non tender. No distension. No guarding or rebound.  "   MSK: Trace lower leg edema. focal area posteriorly of erythema and tenderness.   Neurological: Alert, attentive, moving all extremities equally.   Skin: Skin is warm and dry.     Emergency Department Course     Imaging:  Radiology results were communicated with the patient who voiced understanding of the findings.    US Lower Extremity Venous Duplex Left  No deep venous thrombosis in the left lower extremity.    Reading per radiology     Emergency Department Course:  2038 The patient was sent for US while in the emergency department, results above.     2106 Nursing notes and vitals reviewed.    2110 I performed an exam of the patient as documented above.     2122 Findings and plan explained to the Patient. Patient discharged home with instructions regarding supportive care, medications, and reasons to return. The importance of close follow-up was reviewed. The patient was prescribed as below.    Impression & Plan     Medical Decision Making:  Zehra Salomon is a 88 year old female with past medical history significant for diabetes, history of CVA presenting for revaluation of mechanical fall 10 days prior with left lower leg pain. She has a home health nurse who is worried about possible DVT or cellulitis and sent her in. Her US here is negative and she does not have an chest symptoms. I did not suspect embolization. She did have erythema an warmth to her left calf suggestive of cellulitis. No evidence of sepsis. I will initiated her on Keflex and recommended close pcp follow up. Close return to ED precautions were discussed and she was discharged home. We did discuss XR given her fall, however she reports she is bearing weight and decline XR.  Return precautions were reviewed.        Diagnosis:     ICD-10-CM    1. Cellulitis of left lower extremity  L03.116         Disposition:  Discharged to home.    Discharge Medications:  New Prescriptions    CEPHALEXIN (KEFLEX) 500 MG CAPSULE    Take 1 capsule (500 mg) by  mouth 4 times daily for 7 days     Yoshi Coreas MD  [employer]  11:40 PM 08/19/20       Scribe Disclosure:  I, Carlitos Fernandez, am serving as a scribe at 9:15 PM on 8/19/2020 to document services personally performed by Yoshi Coreas MD based on my observations and the provider's statements to me.          Yoshi Coreas MD  08/19/20 9631

## 2020-08-20 NOTE — ED TRIAGE NOTES
Pt presents to ED through triage with on c/o left leg pain. Pt states that she sprained left leg while attempting to self transfer from her w/c 10 days ago. Pt states that leg and ankle has been getting progressively swollen and painful. Pt concerned about a dvt. Pt on a thinner for previous strokes.

## 2021-03-06 ENCOUNTER — APPOINTMENT (OUTPATIENT)
Dept: GENERAL RADIOLOGY | Facility: CLINIC | Age: 86
End: 2021-03-06
Attending: EMERGENCY MEDICINE
Payer: COMMERCIAL

## 2021-03-06 ENCOUNTER — HOSPITAL ENCOUNTER (EMERGENCY)
Facility: CLINIC | Age: 86
Discharge: HOME | End: 2021-03-06
Attending: EMERGENCY MEDICINE | Admitting: EMERGENCY MEDICINE
Payer: COMMERCIAL

## 2021-03-06 ENCOUNTER — APPOINTMENT (OUTPATIENT)
Dept: CT IMAGING | Facility: CLINIC | Age: 86
End: 2021-03-06
Attending: EMERGENCY MEDICINE
Payer: COMMERCIAL

## 2021-03-06 VITALS
SYSTOLIC BLOOD PRESSURE: 171 MMHG | TEMPERATURE: 98.6 F | DIASTOLIC BLOOD PRESSURE: 78 MMHG | HEART RATE: 102 BPM | OXYGEN SATURATION: 98 % | RESPIRATION RATE: 26 BRPM

## 2021-03-06 DIAGNOSIS — M62.81 MUSCLE WEAKNESS (GENERALIZED): ICD-10-CM

## 2021-03-06 LAB
ALBUMIN SERPL-MCNC: 3.1 G/DL (ref 3.4–5)
ALBUMIN UR-MCNC: 200 MG/DL
ALP SERPL-CCNC: 111 U/L (ref 40–150)
ALT SERPL W P-5'-P-CCNC: 16 U/L (ref 0–50)
ANION GAP SERPL CALCULATED.3IONS-SCNC: 8 MMOL/L (ref 3–14)
APPEARANCE UR: CLEAR
AST SERPL W P-5'-P-CCNC: 10 U/L (ref 0–45)
BASOPHILS # BLD AUTO: 0 10E9/L (ref 0–0.2)
BASOPHILS NFR BLD AUTO: 0.4 %
BILIRUB SERPL-MCNC: 0.3 MG/DL (ref 0.2–1.3)
BILIRUB UR QL STRIP: NEGATIVE
BUN SERPL-MCNC: 16 MG/DL (ref 7–30)
CALCIUM SERPL-MCNC: 8.8 MG/DL (ref 8.5–10.1)
CHLORIDE SERPL-SCNC: 103 MMOL/L (ref 94–109)
CO2 SERPL-SCNC: 26 MMOL/L (ref 20–32)
COLOR UR AUTO: ABNORMAL
CREAT SERPL-MCNC: 1.27 MG/DL (ref 0.52–1.04)
DIFFERENTIAL METHOD BLD: ABNORMAL
EOSINOPHIL # BLD AUTO: 0 10E9/L (ref 0–0.7)
EOSINOPHIL NFR BLD AUTO: 0.2 %
ERYTHROCYTE [DISTWIDTH] IN BLOOD BY AUTOMATED COUNT: 17.2 % (ref 10–15)
GFR SERPL CREATININE-BSD FRML MDRD: 37 ML/MIN/{1.73_M2}
GLUCOSE SERPL-MCNC: 152 MG/DL (ref 70–99)
GLUCOSE UR STRIP-MCNC: 100 MG/DL
HCT VFR BLD AUTO: 31.9 % (ref 35–47)
HGB BLD-MCNC: 9.8 G/DL (ref 11.7–15.7)
HGB UR QL STRIP: ABNORMAL
HYALINE CASTS #/AREA URNS LPF: 4 /LPF (ref 0–2)
IMM GRANULOCYTES # BLD: 0 10E9/L (ref 0–0.4)
IMM GRANULOCYTES NFR BLD: 0.3 %
INTERPRETATION ECG - MUSE: NORMAL
INTERPRETATION ECG - MUSE: NORMAL
KETONES UR STRIP-MCNC: NEGATIVE MG/DL
LACTATE BLD-SCNC: 1.5 MMOL/L (ref 0.7–2)
LEUKOCYTE ESTERASE UR QL STRIP: NEGATIVE
LYMPHOCYTES # BLD AUTO: 0.7 10E9/L (ref 0.8–5.3)
LYMPHOCYTES NFR BLD AUTO: 7.7 %
MCH RBC QN AUTO: 24.9 PG (ref 26.5–33)
MCHC RBC AUTO-ENTMCNC: 30.7 G/DL (ref 31.5–36.5)
MCV RBC AUTO: 81 FL (ref 78–100)
MONOCYTES # BLD AUTO: 0.5 10E9/L (ref 0–1.3)
MONOCYTES NFR BLD AUTO: 5.7 %
MUCOUS THREADS #/AREA URNS LPF: PRESENT /LPF
NEUTROPHILS # BLD AUTO: 7.8 10E9/L (ref 1.6–8.3)
NEUTROPHILS NFR BLD AUTO: 85.7 %
NITRATE UR QL: NEGATIVE
NRBC # BLD AUTO: 0 10*3/UL
NRBC BLD AUTO-RTO: 0 /100
PH UR STRIP: 6.5 PH (ref 5–7)
PLATELET # BLD AUTO: 259 10E9/L (ref 150–450)
POTASSIUM SERPL-SCNC: 3.5 MMOL/L (ref 3.4–5.3)
PROT SERPL-MCNC: 6.8 G/DL (ref 6.8–8.8)
RBC # BLD AUTO: 3.93 10E12/L (ref 3.8–5.2)
RBC #/AREA URNS AUTO: 6 /HPF (ref 0–2)
RENAL EPI CELLS #/AREA URNS HPF: 1 /HPF
SODIUM SERPL-SCNC: 137 MMOL/L (ref 133–144)
SOURCE: ABNORMAL
SP GR UR STRIP: 1.01 (ref 1–1.03)
SQUAMOUS #/AREA URNS AUTO: <1 /HPF (ref 0–1)
TROPONIN I SERPL-MCNC: <0.015 UG/L (ref 0–0.04)
UROBILINOGEN UR STRIP-MCNC: 0 MG/DL (ref 0–2)
WBC # BLD AUTO: 9.2 10E9/L (ref 4–11)
WBC #/AREA URNS AUTO: 1 /HPF (ref 0–5)

## 2021-03-06 PROCEDURE — 70450 CT HEAD/BRAIN W/O DYE: CPT

## 2021-03-06 PROCEDURE — 80053 COMPREHEN METABOLIC PANEL: CPT | Performed by: EMERGENCY MEDICINE

## 2021-03-06 PROCEDURE — 73502 X-RAY EXAM HIP UNI 2-3 VIEWS: CPT

## 2021-03-06 PROCEDURE — 83605 ASSAY OF LACTIC ACID: CPT | Performed by: EMERGENCY MEDICINE

## 2021-03-06 PROCEDURE — 81001 URINALYSIS AUTO W/SCOPE: CPT | Performed by: EMERGENCY MEDICINE

## 2021-03-06 PROCEDURE — 87086 URINE CULTURE/COLONY COUNT: CPT | Performed by: EMERGENCY MEDICINE

## 2021-03-06 PROCEDURE — 73700 CT LOWER EXTREMITY W/O DYE: CPT | Mod: LT

## 2021-03-06 PROCEDURE — 85025 COMPLETE CBC W/AUTO DIFF WBC: CPT | Performed by: EMERGENCY MEDICINE

## 2021-03-06 PROCEDURE — 84484 ASSAY OF TROPONIN QUANT: CPT | Performed by: EMERGENCY MEDICINE

## 2021-03-06 PROCEDURE — 93005 ELECTROCARDIOGRAM TRACING: CPT | Mod: 76

## 2021-03-06 PROCEDURE — 99285 EMERGENCY DEPT VISIT HI MDM: CPT | Mod: 25

## 2021-03-06 PROCEDURE — 93005 ELECTROCARDIOGRAM TRACING: CPT

## 2021-03-06 RX ORDER — GLIMEPIRIDE 4 MG/1
4 TABLET ORAL DAILY
COMMUNITY

## 2021-03-06 ASSESSMENT — ENCOUNTER SYMPTOMS
MYALGIAS: 1
WEAKNESS: 1

## 2021-03-06 NOTE — ED PROVIDER NOTES
History   Chief Complaint:  Generalized Weakness       HPI   Zehra Salomon is a 88 year old female with history of stroke who presents with a fall. Patient reports she has been feeling weak for the past couple days and this morning she fell forward out of her chair. She denied hitting her head but said she was not able to get up. She also complains of left leg pain. She says that she has had a prior fall where she hit her head on a doorway. She currently is taking Plavix. She lives with her son who helps take care of her.    Review of Systems   Musculoskeletal: Positive for myalgias (left leg).   Neurological: Positive for weakness.   10 systems reviewed and negative except as above and in HPI.    Allergies:  Metformin  Penicillins  Cephalexin  Sulfamethoxazole-Trimethoprim  Gabapentin  Influenza virus vaccines  Insulins  Losartan  Nitrofurantoin    Medications:  Norvasc  Lipitor  Plavix  Amaryl  Lancets  Senokot-S  Lisinopril    Past Medical History:    Abnormal stress test  BCC  Esophageal reflux  Hyperlipidemia  Macular degeneration  Menopausal syndrome  Pagets disease  Type II diabetes  Melanocytic nevus  Voice and resonance disorders  CKD  TAAA without rupture  Hypertensive emergency  Ischemia of right lower extremity  Paroxysmal CHB  Dysarthria  Diabetic peripheral neuropathy  Chronic abdominal pain  Mediastinal mass  COPD  GERD  Diverticulosis  Basal carcinoma of the breast  PAD  Osteomyelitis of right foot    Past Surgical History:    Angiogram  C drain mouth absc/cyst/hemat  Cholecystectomy  Hernia procedure  Foot surgery  Colonoscopy  Lipoma resection   section  Cataract removal  Breast lumpectomy  Right second toe amputation  Right iliac artery stent placement     Family History:    Diabetes  Throat cancer  Heart disease    Social History:  Patient has a history of tobacco abuse.  Patient was unaccompanied to the ED.    Physical Exam     Patient Vitals for the past 24 hrs:   BP Temp Temp src  Pulse Resp SpO2   03/06/21 1330 -- -- -- 102 26 --   03/06/21 1230 (!) 171/78 -- -- 97 16 --   03/06/21 1200 (!) 175/73 -- -- 100 17 --   03/06/21 1145 -- -- -- -- -- 98 %   03/06/21 1136 (!) 144/72 -- -- 101 (!) 32 95 %   03/06/21 1100 (!) 171/78 -- -- 103 (!) 46 95 %   03/06/21 1033 (!) 189/91 98.6  F (37  C) Oral 107 24 96 %       Physical Exam    General: Resting on the gurney, appears comfortable  Head:  The scalp, face, and head appear normal  Mouth/Throat: Mucus membranes are moist  CV:  Regular rate    Normal S1 and S2  No pathological murmur   Resp:  Breath sounds clear and equal bilaterally    Non-labored, no retractions or accessory muscle use    No coarseness    No wheezing   GI:  Abdomen is soft, no rigidity    Left hip with mild tenderness to palpation. None with range of motion.    No tenderness to bruising of the resertion.  MS:  Normal motor assessment of all extremities.    Good capillary refill noted.  Skin:  No rash or lesions noted.  Neuro: Speech is normal and fluent. No apparent deficit.  Psych:  Awake. Alert.  Normal affect.      Appropriate interactions.        Emergency Department Course     ECG  ECG taken at 1037, ECG read at 1038  Sinus tachycardia  Left axis deviation  Nonspecific ST abnormality  Abnormal ECG   Rate 107 bpm. KS interval 184 ms. QRS duration 74 ms. QT/QTc 302/403 ms. P-R-T axes 71 -56 -19    ECG:  ECG taken at 1140, ECG read at 1145  Normal sinus rhythm  Left axis deviation  Nonspecific ST abnormality  Abnormal ECG  Rate 100 bpm. KS interval 186 ms. QRS duration 76 ms. QT/QTc 340/438 ms. P-R-T axes 75 -50 -33.    Imaging:    CT Hip Left w/o Contrast  1.  No evidence of a left hip fracture. If there is high clinical concern for an occult fracture or the patient is nonweightbearing, recommend MRI of the pelvis.    2.  Bone demineralization.    3.  Mild to moderate degenerative changes of the left hip, with marginal osteophyte formation at the femoral head/neck  junction.    Findings were discussed with Hillary Nguyen at the time of interpretation.     XR Pelvis w Hip Left 1 View  1. Possible nondisplaced fracture in the left femoral neck seen along the medial cortex on the frontal view. MRI or CT could further evaluate.  2. Diffuse bone demineralization. Arterial calcifications and stents. Mild to moderate degenerative changes in the lower lumbar spine, SI joints and right hip. 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    CT Head w/o Contrast  Diffuse cerebral volume loss and cerebral white matter changes consistent with chronic small vessel ischemic disease. No evidence for acute intracranial pathology.    Radiation dose for this scan was reduced using automated exposure  control, adjustment of the mA and/or kV according to patient size, or  iterative reconstruction technique    Readings per radiology    Laboratory:  CBC: WBC 9.2, HGB 9.8 (L),    CMP: Glucose 152 (H), GFR 37 (L), Albumin 3.1 (L), (Creatinine: 1.27 (H)) o/w WNL  Troponin (Collected 1054): <0.015    Lactic acid (result time 1109) 1.5     UA with micro: Glucose 100 (A), Blood Small (A), Protein Albumin 200 (A), RBC/HPF 6 (H), Renal Tub Epi 1 (A), Mucous Present (A), Hyaline Casts 4 (H), o/w negative    Urine Culture: Pending    Emergency Department Course:    Reviewed:  I reviewed nursing notes, vitals, past medical history and care everywhere    Assessments:  1043 I obtained history and examined the patient as noted above.     Consults:   1337 I consulted with Dr. Marrero of Radiology and discussed patient findings.    Disposition:  The patient was discharged to home.       Impression & Plan     Medical Decision Making:    Zehra Salomon is a 88 year old female who presents for evaluation of weakness and fall.  Associated symptoms today have included myalgias.  The workup here in the emergency room was to evaluate for infections, metabolic, electrolyte, neurologic or cardiovascular causes.   Of note, there are no signs of pneumonia or UTI causing the symptoms.  In addition, I do not believe symptoms are due to CVA, TIA, myasthesia gravis, myopathy or acute coronary syndromes and there are no indications at this point for a further workup with a benign history, exam and laboratory tests.  They were ambulated in ED and did well. Unfortunately the patient insisted on discharge while I was caring for a critically ill patient and could not have a complete discharge conversation with her and her family.  Recommend followup with their primary care doctor in 1-2 days for a recheck. Return for any additional symptoms or worsening weakness.        Diagnosis:    ICD-10-CM    1. Muscle weakness (generalized)  M62.81        Scribe Disclosure:  I, Ray Gaitan, am serving as a scribe at 10:41 AM on 3/6/2021 to document services personally performed by Hillary Nguyen MD based on my observations and the provider's statements to me.              Hillary Nguyen MD  03/07/21 1620

## 2021-03-06 NOTE — PHARMACY-ADMISSION MEDICATION HISTORY
Pharmacy Medication History  Admission medication history interview status for the 3/6/2021  admission is complete. See EPIC admission navigator for prior to admission medications     Location of Interview: Phone  Medication history sources: Patient's family/friend (Yvette, pt's PCA, [phone])    Significant changes made to the medication list:  Added:   - Tylenol PM  - Lantus: Yvette reports that patient only injects as needed when her BG is high.     Removed:  - aspirin, ibuprofen: Yvette reports that she is no longer taking them    Changed:   - glimepiride: changed from 2 mg daily to 4 mg daily    In the past week, patient estimated taking medication this percent of the time: 50-90% due to patient does not like to take medications    Additional medication history information:   - Atorvastatin: patient has been taking 20 mg daily, but Yvette reports that patient stopped taking it herself about 2 weeks ago because patient does not like to take medications.   - Lisinopril: Yvette reports that patient stopped taking it about 1-2 months ago because of having adverse effect (feeling weakness).     Medication reconciliation completed by provider prior to medication history? No    Time spent in this activity: 15 minutes    Prior to Admission medications    Medication Sig Last Dose Taking? Auth Provider   amLODIPine (NORVASC) 5 MG tablet Take 5 mg by mouth daily  3/5/2021 at Unknown time Yes Reported, Patient   clopidogrel (PLAVIX) 75 MG tablet Take 75 mg by mouth daily  3/5/2021 at Unknown time Yes Reported, Patient   diphenhydrAMINE-acetaminophen (TYLENOL PM)  MG tablet Take 1 tablet by mouth nightly as needed  at PRN Yes Unknown, Entered By History   glimepiride (AMARYL) 4 MG tablet Take 4 mg by mouth daily 3/5/2021 at Unknown time Yes Unknown, Entered By History   insulin glargine (LANTUS PEN) 100 UNIT/ML pen Inject 4-8 Units Subcutaneous daily as needed Past Week at Unknown time Yes Unknown, Entered By History    atorvastatin (LIPITOR) 20 MG tablet Take 20 mg by mouth daily    Reported, Patient   Blood Glucose Monitoring Suppl (ONE TOUCH ULTRA 2) W/DEVICE KIT 1 Device by Device route daily. Use to test blood sugars 2x daily as directed.   Orlando Louie MD   glucose blood VI test strips (ONE TOUCH ULTRA TEST) strip 1 Box by Strip route 2 times daily.   Orlando Louie MD   ONE TOUCH DELICA LANCETS MISC Use to test blood sugars 2 daily or as directed.   Orlando Louie MD       The information provided in this note is only as accurate as the sources available at the time of update(s)

## 2021-03-06 NOTE — DISCHARGE INSTRUCTIONS
We recommended you stay in the hospital for your weakness.  You wanted to leave and are being discharged to your son by your request.  You can return at any time for further evaluation or if you feel unsafe.

## 2021-03-06 NOTE — ED NOTES
Bed: ED19  Expected date:   Expected time:   Means of arrival:   Comments:  [phone]  [phone] F gen weakness/fall/no injuries  1023

## 2021-03-07 LAB
BACTERIA SPEC CULT: NORMAL
Lab: NORMAL
SPECIMEN SOURCE: NORMAL